# Patient Record
Sex: FEMALE | Race: WHITE | NOT HISPANIC OR LATINO | Employment: UNEMPLOYED | URBAN - METROPOLITAN AREA
[De-identification: names, ages, dates, MRNs, and addresses within clinical notes are randomized per-mention and may not be internally consistent; named-entity substitution may affect disease eponyms.]

---

## 2017-10-25 PROBLEM — M79.641 BILATERAL HAND PAIN: Status: ACTIVE | Noted: 2017-10-25

## 2017-10-25 PROBLEM — M79.642 BILATERAL HAND PAIN: Status: ACTIVE | Noted: 2017-10-25

## 2018-08-23 PROBLEM — G56.00 CARPAL TUNNEL SYNDROME: Status: ACTIVE | Noted: 2018-08-23

## 2018-08-24 ENCOUNTER — PATIENT MESSAGE (OUTPATIENT)
Dept: ORTHOPEDICS | Facility: CLINIC | Age: 43
End: 2018-08-24

## 2018-09-29 ENCOUNTER — PATIENT MESSAGE (OUTPATIENT)
Dept: ORTHOPEDICS | Facility: CLINIC | Age: 43
End: 2018-09-29

## 2018-10-02 ENCOUNTER — TELEPHONE (OUTPATIENT)
Dept: ORTHOPEDICS | Facility: HOSPITAL | Age: 43
End: 2018-10-02

## 2018-10-02 NOTE — TELEPHONE ENCOUNTER
Called pt regarding her questions about working after surgery and the pain in her wrist. I stated that pt can take tylenol for her pain and also I discuss with her that pt's who usually have wrist surgeries it takes about 2-4 weeks before those pt's can return back to work. Pt verbally understood.

## 2021-05-06 ENCOUNTER — PATIENT MESSAGE (OUTPATIENT)
Dept: RESEARCH | Facility: HOSPITAL | Age: 46
End: 2021-05-06